# Patient Record
Sex: FEMALE | Race: WHITE | NOT HISPANIC OR LATINO | Employment: FULL TIME | URBAN - METROPOLITAN AREA
[De-identification: names, ages, dates, MRNs, and addresses within clinical notes are randomized per-mention and may not be internally consistent; named-entity substitution may affect disease eponyms.]

---

## 2017-08-05 ENCOUNTER — HOSPITAL ENCOUNTER (EMERGENCY)
Facility: MEDICAL CENTER | Age: 52
End: 2017-08-05
Attending: EMERGENCY MEDICINE
Payer: COMMERCIAL

## 2017-08-05 VITALS
SYSTOLIC BLOOD PRESSURE: 143 MMHG | BODY MASS INDEX: 32.65 KG/M2 | HEIGHT: 69 IN | TEMPERATURE: 99 F | HEART RATE: 92 BPM | DIASTOLIC BLOOD PRESSURE: 89 MMHG | RESPIRATION RATE: 18 BRPM | WEIGHT: 220.46 LBS | OXYGEN SATURATION: 98 %

## 2017-08-05 DIAGNOSIS — S61.219A LACERATION OF FINGER OF LEFT HAND, INITIAL ENCOUNTER: Primary | ICD-10-CM

## 2017-08-05 PROCEDURE — 304999 HCHG REPAIR-SIMPLE/INTERMED LEVEL 1

## 2017-08-05 PROCEDURE — 303747 HCHG EXTRA SUTURE

## 2017-08-05 PROCEDURE — A9270 NON-COVERED ITEM OR SERVICE: HCPCS | Performed by: EMERGENCY MEDICINE

## 2017-08-05 PROCEDURE — 700102 HCHG RX REV CODE 250 W/ 637 OVERRIDE(OP): Performed by: EMERGENCY MEDICINE

## 2017-08-05 PROCEDURE — 99283 EMERGENCY DEPT VISIT LOW MDM: CPT

## 2017-08-05 PROCEDURE — 90715 TDAP VACCINE 7 YRS/> IM: CPT | Performed by: EMERGENCY MEDICINE

## 2017-08-05 PROCEDURE — 304217 HCHG IRRIGATION SYSTEM

## 2017-08-05 PROCEDURE — 90471 IMMUNIZATION ADMIN: CPT

## 2017-08-05 PROCEDURE — 700111 HCHG RX REV CODE 636 W/ 250 OVERRIDE (IP): Performed by: EMERGENCY MEDICINE

## 2017-08-05 RX ORDER — HYDROCODONE BITARTRATE AND ACETAMINOPHEN 5; 325 MG/1; MG/1
1 TABLET ORAL ONCE
Status: COMPLETED | OUTPATIENT
Start: 2017-08-05 | End: 2017-08-05

## 2017-08-05 RX ORDER — IBUPROFEN 600 MG/1
600 TABLET ORAL ONCE
Status: COMPLETED | OUTPATIENT
Start: 2017-08-05 | End: 2017-08-05

## 2017-08-05 RX ADMIN — IBUPROFEN 600 MG: 600 TABLET, FILM COATED ORAL at 21:16

## 2017-08-05 RX ADMIN — HYDROCODONE BITARTRATE AND ACETAMINOPHEN 1 TABLET: 5; 325 TABLET ORAL at 21:15

## 2017-08-05 RX ADMIN — CLOSTRIDIUM TETANI TOXOID ANTIGEN (FORMALDEHYDE INACTIVATED), CORYNEBACTERIUM DIPHTHERIAE TOXOID ANTIGEN (FORMALDEHYDE INACTIVATED), BORDETELLA PERTUSSIS TOXOID ANTIGEN (GLUTARALDEHYDE INACTIVATED), BORDETELLA PERTUSSIS FILAMENTOUS HEMAGGLUTININ ANTIGEN (FORMALDEHYDE INACTIVATED), BORDETELLA PERTUSSIS PERTACTIN ANTIGEN, AND BORDETELLA PERTUSSIS FIMBRIAE 2/3 ANTIGEN 0.5 ML: 5; 2; 2.5; 5; 3; 5 INJECTION, SUSPENSION INTRAMUSCULAR at 21:00

## 2017-08-05 ASSESSMENT — PAIN SCALES - GENERAL: PAINLEVEL_OUTOF10: 4

## 2017-08-05 NOTE — ED AVS SNAPSHOT
8/5/2017    Pretty Guerrero  [address] [place] [address]    Dear Pretty:    [facility] wants to ensure your discharge home is safe and you or your loved ones have had all of your questions answered regarding your care after you leave the hospital.    Below is a list of resources and contact information should you have any questions regarding your hospital stay, follow-up instructions, or active medical symptoms.    Questions or Concerns Regarding… Contact   Medical Questions Related to Your Discharge  (7 days a week, 8am-5pm) Contact a Nurse Care Coordinator   [phone]   Medical Questions Not Related to Your Discharge  (24 hours a day / 7 days a week)  Contact the Nurse Health Line   [phone]    Medications or Discharge Instructions Refer to your discharge packet   or contact your [facility] Primary Care Provider   [phone]   Follow-up Appointment(s) Schedule your appointment via Transfercar   or contact Scheduling [phone]   Billing Review your statement via Transfercar  or contact Billing [phone]   Medical Records Review your records via Transfercar   or contact Medical Records [phone]     You may receive a telephone call within two days of discharge. This call is to make certain you understand your discharge instructions and have the opportunity to have any questions answered. You can also easily access your medical information, test results and upcoming appointments via the Transfercar free online health management tool. You can learn more and sign up at [email or web address]. For assistance setting up your Transfercar account, please call [phone].    Once again, we want to ensure your discharge home is safe and that you have a clear understanding of any next steps in your care. If you have any questions or concerns, please do not hesitate to contact us, we are here for you. Thank you for choosing [facility] for your healthcare needs.    Sincerely,    Your [facility] Healthcare Team

## 2017-08-05 NOTE — ED AVS SNAPSHOT
"[employer] Access Code: [email or web address]  Expires: 9/4/2017  8:35 PM    "[employer]  A secure, online tool to manage your health information     [employer]’s "[employer]® is a secure, online tool that connects you to your personalized health information from the privacy of your home -- day or night - making it very easy for you to manage your healthcare. Once the activation process is completed, you can even access your medical information using the "[employer] jamie, which is available for free in the Apple Jamie store or Google Play store.     "[employer] provides the following levels of access (as shown below):   My Chart Features   [facility] Primary Care Doctor [facility]  Specialists [facility]  Urgent  Care Non-[facility]  Primary Care  Doctor   Email your healthcare team securely and privately 24/7 X X X X   Manage appointments: schedule your next appointment; view details of past/upcoming appointments X      Request prescription refills. X      View recent personal medical records, including lab and immunizations X X X X   View health record, including health history, allergies, medications X X X X   Read reports about your outpatient visits, procedures, consult and ER notes X X X X   See your discharge summary, which is a recap of your hospital and/or ER visit that includes your diagnosis, lab results, and care plan. X X       How to register for "[employer]:  1. Go to  [email or web address].  2. Click on the Sign Up Now box, which takes you to the New Member Sign Up page. You will need to provide the following information:  a. Enter your "[employer] Access Code exactly as it appears at the top of this page. (You will not need to use this code after you’ve completed the sign-up process. If you do not sign up before the expiration date, you must request a new code.)   b. Enter your date of birth.   c. Enter your home email address.   d. Click Submit, and follow the next screen’s instructions.  3. Create a "[employer] ID. This will be your [employer]Met  login ID and cannot be changed, so think of one that is secure and easy to remember.  4. Create a [employer] password. You can change your password at any time.  5. Enter your Password Reset Question and Answer. This can be used at a later time if you forget your password.   6. Enter your e-mail address. This allows you to receive e-mail notifications when new information is available in [employer].  7. Click Sign Up. You can now view your health information.    For assistance activating your [employer] account, call [phone]

## 2017-08-05 NOTE — ED AVS SNAPSHOT
Home Care Instructions                                                                                                                Pretty Guerrero   MRN: [id number]    Department:  [facility], Emergency Dept   Date of Visit:  8/5/2017            [facility] Emergency Dept    [address]    [place] [address]    Phone:  [phone]      You were seen by     Calista Peck D.O.      Your Diagnosis Was     Laceration of finger of left hand, initial encounter     S61.219A       These are the medications you received during your hospitalization from 08/05/2017 2032 to 08/05/2017 2118     Date/Time Order Dose Route Action    08/05/2017 2100 tetanus-dipth-acell pertussis (ADACEL) inj 0.5 mL 0.5 mL Intramuscular Given    08/05/2017 2116 ibuprofen (MOTRIN) tablet 600 mg 600 mg Oral Given    08/05/2017 2115 hydrocodone-acetaminophen (NORCO) 5-325 MG per tablet 1 Tab 1 Tab Oral Given      Follow-up Information     1. Follow up with [facility], Emergency Dept In 2 days.    Specialty:  Emergency Medicine    Why:  For wound re-check    Contact information    [address] [address] [phone]        2. Follow up with [facility] Emergency Dept In 1 week.    Specialty:  Emergency Medicine    Why:  For suture removal    Contact information    [address] [address] [phone]        3. Follow up with [facility] In 2 days.    Contact information    [facility] [address] [phone]          4. Follow up with [facility] In 2 days.    Contact information    [address] [place] [address] [phone]        5. Follow up with Cris Velazquez D.O. In 2 days.    Specialty:  Family Medicine    Contact information    [address]  [facility] [address] [phone]        Medication Information     Review all of your home medications and newly  ordered medications with your primary doctor and/or pharmacist as soon as possible. Follow medication instructions as directed by your doctor and/or pharmacist.     Please keep your complete medication list with you and share with your physician. Update the information when medications are discontinued, doses are changed, or new medications (including over-the-counter products) are added; and carry medication information at all times in the event of emergency situations.               Medication List      Notice     You have not been prescribed any medications.              Discharge Instructions       Follow-up with primary care, urgent care emergency department in 48 hours for reevaluation and wound check.  Follow-up with primary care, urgent care or emergency department in 7 days for suture removal.    Your blood pressure was elevated in the emergency department, follow-up with primary care for close monitoring.    Keep wound clean, dry and intact. Starting tomorrow he may cleanse wound gently, warm water, soap, pat dry. Apply antibiotic ointment twice daily. Keep covered active.  Rest and elevation as needed for throbbing, swelling.    Tylenol or Motrin as needed for discomfort.    Return to the emergency department for increased pain, swelling, redness, bleeding or other drainage, fever or other new concerns.      Laceration Care, Adult  A laceration is a cut that goes through all layers of the skin. The cut also goes into the tissue that is right under the skin. Some cuts heal on their own. Others need to be closed with stitches (sutures), staples, skin adhesive strips, or wound glue. Taking care of your cut lowers your risk of infection and helps your cut to heal better.  HOW TO TAKE CARE OF YOUR CUT  For stitches or staples:  · Keep the wound clean and dry.  · If you were given a bandage (dressing), you should change it at least one time per day or as told by your doctor. You should also change it if it gets  wet or dirty.  · Keep the wound completely dry for the first 24 hours or as told by your doctor. After that time, you may take a shower or a bath. However, make sure that the wound is not soaked in water until after the stitches or staples have been removed.  · Clean the wound one time each day or as told by your doctor:  ¨ Wash the wound with soap and water.  ¨ Rinse the wound with water until all of the soap comes off.  ¨ Pat the wound dry with a clean towel. Do not rub the wound.  · After you clean the wound, put a thin layer of antibiotic ointment on it as told by your doctor. This ointment:  ¨ Helps to prevent infection.  ¨ Keeps the bandage from sticking to the wound.  · Have your stitches or staples removed as told by your doctor.  If your doctor used skin adhesive strips:   · Keep the wound clean and dry.  · If you were given a bandage, you should change it at least one time per day or as told by your doctor. You should also change it if it gets dirty or wet.  · Do not get the skin adhesive strips wet. You can take a shower or a bath, but be careful to keep the wound dry.  · If the wound gets wet, pat it dry with a clean towel. Do not rub the wound.  · Skin adhesive strips fall off on their own. You can trim the strips as the wound heals. Do not remove any strips that are still stuck to the wound. They will fall off after a while.  If your doctor used wound glue:  · Try to keep your wound dry, but you may briefly wet it in the shower or bath. Do not soak the wound in water, such as by swimming.  · After you take a shower or a bath, gently pat the wound dry with a clean towel. Do not rub the wound.  · Do not do any activities that will make you really sweaty until the skin glue has fallen off on its own.  · Do not apply liquid, cream, or ointment medicine to your wound while the skin glue is still on.  · If you were given a bandage, you should change it at least one time per day or as told by your doctor. You  should also change it if it gets dirty or wet.  · If a bandage is placed over the wound, do not let the tape for the bandage touch the skin glue.  · Do not pick at the glue. The skin glue usually stays on for 5-10 days. Then, it falls off of the skin.  General Instructions   · To help prevent scarring, make sure to cover your wound with sunscreen whenever you are outside after stitches are removed, after adhesive strips are removed, or when wound glue stays in place and the wound is healed. Make sure to wear a sunscreen of at least 30 SPF.  · Take over-the-counter and prescription medicines only as told by your doctor.  · If you were given antibiotic medicine or ointment, take or apply it as told by your doctor. Do not stop using the antibiotic even if your wound is getting better.  · Do not scratch or pick at the wound.  · Keep all follow-up visits as told by your doctor. This is important.  · Check your wound every day for signs of infection. Watch for:  ¨ Redness, swelling, or pain.  ¨ Fluid, blood, or pus.  · Raise (elevate) the injured area above the level of your heart while you are sitting or lying down, if possible.  GET HELP IF:  · You got a tetanus shot and you have any of these problems at the injection site:  ¨ Swelling.  ¨ Very bad pain.  ¨ Redness.  ¨ Bleeding.  · You have a fever.  · A wound that was closed breaks open.  · You notice a bad smell coming from your wound or your bandage.  · You notice something coming out of the wound, such as wood or glass.  · Medicine does not help your pain.  · You have more redness, swelling, or pain at the site of your wound.  · You have fluid, blood, or pus coming from your wound.  · You notice a change in the color of your skin near your wound.  · You need to change the bandage often because fluid, blood, or pus is coming from the wound.  · You start to have a new rash.  · You start to have numbness around the wound.  GET HELP RIGHT AWAY IF:  · You have very bad  swelling around the wound.  · Your pain suddenly gets worse and is very bad.  · You notice painful lumps near the wound or on skin that is anywhere on your body.  · You have a red streak going away from your wound.  · The wound is on your hand or foot and you cannot move a finger or toe like you usually can.  · The wound is on your hand or foot and you notice that your fingers or toes look pale or bluish.     This information is not intended to replace advice given to you by your health care provider. Make sure you discuss any questions you have with your health care provider.     Document Released: 06/05/2009 Document Revised: 05/03/2016 Document Reviewed: 12/14/2015  [employer] Interactive Patient Education ©2016 [employer]                Patient Information     Patient Information    Following emergency treatment: all patient requiring follow-up care must return either to a private physician or a clinic if your condition worsens before you are able to obtain further medical attention, please return to the emergency room.     Billing Information    At [facility], we work to make the billing process streamlined for our patients.  Our Representatives are here to answer any questions you may have regarding your hospital bill.  If you have insurance coverage and have supplied your insurance information to us, we will submit a claim to your insurer on your behalf.  Should you have any questions regarding your bill, we can be reached online or by phone as follows:  Online: You are able pay your bills online or live chat with our representatives about any billing questions you may have. We are here to help Monday - Friday from 8:00am to 7:30pm and 9:00am - 12:00pm on Saturdays.  Please visit [email or web address]  for more information.   Phone:  [phone] or [phone]    Please note that your emergency physician, surgeon, pathologist, radiologist, anesthesiologist, and other  specialists are not employed by [facility] and will therefore bill separately for their services.  Please contact them directly for any questions concerning their bills at the numbers below:     Emergency Physician Services:  [phone]  [facility]:  [phone]  [facility]:  [phone]  [facility]:  [phone]    1. Your final bill may vary from the amount quoted upon discharge if all procedures are not complete at that time, or if your doctor has additional procedures of which we are not aware. You will receive an additional bill if you return to the Emergency Department at [facility] for suture removal regardless of the facility of which the sutures were placed.     2. Please arrange for settlement of this account at the emergency registration.    3. All self-pay accounts are due in full at the time of treatment.  If you are unable to meet this obligation then payment is expected within 4-5 days.     4. If you have had radiology studies (CT, X-ray, Ultrasound, MRI), you have received a preliminary result during your emergency department visit. Please contact the radiology department [phone] to receive a copy of your final result. Please discuss the Final result with your primary physician or with the follow up physician provided.     Crisis Hotline:  [place] Crisis Hotline:  1-724-SVKTAWJ or 1-814.377.2014  [place] Crisis Hotline:    1-544.777.3938 or 539-561-7911         ED Discharge Follow Up Questions    1. In order to provide you with very good care, we would like to follow up with a phone call in the next few days.  May we have your permission to contact you?     YES /  NO    2. What is the best phone number to call you? (       )_____-__________    3. What is the best time to call you?      Morning  /  Afternoon  /  Evening                   Patient Signature:  ____________________________________________________________    Date:   ____________________________________________________________

## 2017-08-06 NOTE — ED PROVIDER NOTES
"ED Provider Note    CHIEF COMPLAINT  Chief Complaint   Patient presents with   • Laceration       HPI  Pretty Guerrero is a 52 y.o. female who presents to the emergency department for left index finger laceration. Patient was cutting bread when the knife slipped and cut her finger. Bleeding controlled with pressure prior to arrival. Unknown last tetanus, greater than 5 years. No medications taken for discomfort.    REVIEW OF SYSTEMS  See HPI for further details. All other systems are negative.     PAST MEDICAL HISTORY    denies    SOCIAL HISTORY  Social History     Social History Main Topics   • Smoking status: Never Smoker    • Smokeless tobacco: Never Used   • Alcohol Use: Yes      Comment: Occasionally   • Drug Use: No   • Sexual Activity: Not on file       SURGICAL HISTORY  patient denies any surgical history    CURRENT MEDICATIONS  Home Medications     Reviewed by Mitchell Brizuela R.N. (Registered Nurse) on 08/05/17 at 2043  Med List Status: Complete    Medication Last Dose Status          Patient Stuart Taking any Medications                        ALLERGIES  No Known Allergies    PHYSICAL EXAM  VITAL SIGNS: /89 mmHg  Pulse 92  Temp(Src) 37.2 °C (99 °F)  Resp 18  Ht 1.753 m (5' 9\")  Wt 100 kg (220 lb 7.4 oz)  BMI 32.54 kg/m2  SpO2 98%  Pulse ox interpretation: I interpret this pulse ox as normal.  Constitutional: Alert in no apparent distress.  HENT: Normocephalic, atraumatic. Bilateral external ears normal, Nose normal. Moist mucous membranes.    Eyes: Pupils are equal and reactive, Conjunctiva normal.   Neck: Normal range of motion, Supple.  Cardiovascular: Normal peripheral perfusion.  Thorax & Lungs: Nonlabored respirations.  Skin: Warm, Dry. There is a 1.5 cm subcutaneous laceration over the PIP of the left dorsal index finger. Full range of motion, flexion and extension intact. Less than 2 2nd capillary refill, sensation intact to light touch distally. No exposed neurovascular bundle, nor " tendon.  Musculoskeletal: Good range of motion in all major joints.   Neurologic: Alert , no gross focal deficit noted.  Psychiatric: Affect normal, Judgment normal, Mood normal.       DIAGNOSTIC STUDIES / PROCEDURES    LACERATION REPAIR PROCEDURE NOTE  The patient's identification was confirmed and consent was obtained.  This procedure was performed by Dr. Peck.  Site: Left index finger  Sterile procedures observed  Anesthetic used (type and amt): 3 cc lidocaine 1% without epinephrine   Suture type/size: 4-0 nylons   Length: 1.5 cm  # of Sutures: 4  Technique: Simple interrupted  Complexity: Simple  Antibx ointment applied, Adaptic, gauze, Kerlix.  Tetanus ordered and given  Site anesthetized, irrigated with NS, explored without evidence of foreign body, wound well approximated, site covered with dry, sterile dressing. Patient tolerated procedure well without complications. Instructions for care discussed verbally and patient provided with additional written instructions for homecare and f/u.      COURSE & MEDICAL DECISION MAKING  Left index finger laceration was repaired as described above with good hemostasis and approximation. Nonstick dressing placed by myself. CMS intact distally. Tetanus updated.    Patient is stable for discharge at this time, anticipatory guidance and wound care instructions provided, close follow-up is encouraged in 48 hours for wound check in 7-10 days for suture removal, and strict ED return instructions have been detailed. Patient and her  are agreeable to the disposition and plan.    Patient's blood pressure was elevated in the emergency department, and has been referred to primary care for close monitoring.    FINAL IMPRESSION  (S61.219A) Laceration of finger of left hand, initial encounter  (primary encounter diagnosis)      Electronically signed by: Calista Peck, 8/5/2017 9:00 PM      This dictation was created using voice recognition software. The accuracy of the  dictation is limited to the abilities of the software. I expect there may be some errors of grammar and possibly content. The nursing notes were reviewed and certain aspects of this information were incorporated into this note.

## 2017-08-06 NOTE — DISCHARGE INSTRUCTIONS
Follow-up with primary care, urgent care emergency department in 48 hours for reevaluation and wound check.  Follow-up with primary care, urgent care or emergency department in 7 days for suture removal.    Your blood pressure was elevated in the emergency department, follow-up with primary care for close monitoring.    Keep wound clean, dry and intact. Starting tomorrow he may cleanse wound gently, warm water, soap, pat dry. Apply antibiotic ointment twice daily. Keep covered active.  Rest and elevation as needed for throbbing, swelling.    Tylenol or Motrin as needed for discomfort.    Return to the emergency department for increased pain, swelling, redness, bleeding or other drainage, fever or other new concerns.      Laceration Care, Adult  A laceration is a cut that goes through all layers of the skin. The cut also goes into the tissue that is right under the skin. Some cuts heal on their own. Others need to be closed with stitches (sutures), staples, skin adhesive strips, or wound glue. Taking care of your cut lowers your risk of infection and helps your cut to heal better.  HOW TO TAKE CARE OF YOUR CUT  For stitches or staples:  · Keep the wound clean and dry.  · If you were given a bandage (dressing), you should change it at least one time per day or as told by your doctor. You should also change it if it gets wet or dirty.  · Keep the wound completely dry for the first 24 hours or as told by your doctor. After that time, you may take a shower or a bath. However, make sure that the wound is not soaked in water until after the stitches or staples have been removed.  · Clean the wound one time each day or as told by your doctor:  ¨ Wash the wound with soap and water.  ¨ Rinse the wound with water until all of the soap comes off.  ¨ Pat the wound dry with a clean towel. Do not rub the wound.  · After you clean the wound, put a thin layer of antibiotic ointment on it as told by your doctor. This  ointment:  ¨ Helps to prevent infection.  ¨ Keeps the bandage from sticking to the wound.  · Have your stitches or staples removed as told by your doctor.  If your doctor used skin adhesive strips:   · Keep the wound clean and dry.  · If you were given a bandage, you should change it at least one time per day or as told by your doctor. You should also change it if it gets dirty or wet.  · Do not get the skin adhesive strips wet. You can take a shower or a bath, but be careful to keep the wound dry.  · If the wound gets wet, pat it dry with a clean towel. Do not rub the wound.  · Skin adhesive strips fall off on their own. You can trim the strips as the wound heals. Do not remove any strips that are still stuck to the wound. They will fall off after a while.  If your doctor used wound glue:  · Try to keep your wound dry, but you may briefly wet it in the shower or bath. Do not soak the wound in water, such as by swimming.  · After you take a shower or a bath, gently pat the wound dry with a clean towel. Do not rub the wound.  · Do not do any activities that will make you really sweaty until the skin glue has fallen off on its own.  · Do not apply liquid, cream, or ointment medicine to your wound while the skin glue is still on.  · If you were given a bandage, you should change it at least one time per day or as told by your doctor. You should also change it if it gets dirty or wet.  · If a bandage is placed over the wound, do not let the tape for the bandage touch the skin glue.  · Do not pick at the glue. The skin glue usually stays on for 5-10 days. Then, it falls off of the skin.  General Instructions   · To help prevent scarring, make sure to cover your wound with sunscreen whenever you are outside after stitches are removed, after adhesive strips are removed, or when wound glue stays in place and the wound is healed. Make sure to wear a sunscreen of at least 30 SPF.  · Take over-the-counter and prescription  medicines only as told by your doctor.  · If you were given antibiotic medicine or ointment, take or apply it as told by your doctor. Do not stop using the antibiotic even if your wound is getting better.  · Do not scratch or pick at the wound.  · Keep all follow-up visits as told by your doctor. This is important.  · Check your wound every day for signs of infection. Watch for:  ¨ Redness, swelling, or pain.  ¨ Fluid, blood, or pus.  · Raise (elevate) the injured area above the level of your heart while you are sitting or lying down, if possible.  GET HELP IF:  · You got a tetanus shot and you have any of these problems at the injection site:  ¨ Swelling.  ¨ Very bad pain.  ¨ Redness.  ¨ Bleeding.  · You have a fever.  · A wound that was closed breaks open.  · You notice a bad smell coming from your wound or your bandage.  · You notice something coming out of the wound, such as wood or glass.  · Medicine does not help your pain.  · You have more redness, swelling, or pain at the site of your wound.  · You have fluid, blood, or pus coming from your wound.  · You notice a change in the color of your skin near your wound.  · You need to change the bandage often because fluid, blood, or pus is coming from the wound.  · You start to have a new rash.  · You start to have numbness around the wound.  GET HELP RIGHT AWAY IF:  · You have very bad swelling around the wound.  · Your pain suddenly gets worse and is very bad.  · You notice painful lumps near the wound or on skin that is anywhere on your body.  · You have a red streak going away from your wound.  · The wound is on your hand or foot and you cannot move a finger or toe like you usually can.  · The wound is on your hand or foot and you notice that your fingers or toes look pale or bluish.     This information is not intended to replace advice given to you by your health care provider. Make sure you discuss any questions you have with your health care provider.      Document Released: 06/05/2009 Document Revised: 05/03/2016 Document Reviewed: 12/14/2015  [employer] Patient Education ©2016 [employer]

## 2017-09-21 ENCOUNTER — OFFICE VISIT (OUTPATIENT)
Dept: MEDICAL GROUP | Age: 52
End: 2017-09-21
Payer: COMMERCIAL

## 2017-09-21 VITALS
DIASTOLIC BLOOD PRESSURE: 68 MMHG | BODY MASS INDEX: 34.15 KG/M2 | TEMPERATURE: 98.2 F | HEIGHT: 67 IN | WEIGHT: 217.6 LBS | OXYGEN SATURATION: 98 % | HEART RATE: 88 BPM | SYSTOLIC BLOOD PRESSURE: 116 MMHG

## 2017-09-21 DIAGNOSIS — Z12.31 VISIT FOR SCREENING MAMMOGRAM: ICD-10-CM

## 2017-09-21 DIAGNOSIS — G89.29 CHRONIC LEFT SHOULDER PAIN: ICD-10-CM

## 2017-09-21 DIAGNOSIS — Z98.84 STATUS POST GASTRIC BYPASS FOR OBESITY: ICD-10-CM

## 2017-09-21 DIAGNOSIS — M25.512 CHRONIC LEFT SHOULDER PAIN: ICD-10-CM

## 2017-09-21 DIAGNOSIS — E66.9 OBESITY (BMI 30.0-34.9): ICD-10-CM

## 2017-09-21 PROCEDURE — 99204 OFFICE O/P NEW MOD 45 MIN: CPT | Performed by: INTERNAL MEDICINE

## 2017-09-21 RX ORDER — CHOLECALCIFEROL (VITAMIN D3) 50 MCG
TABLET ORAL DAILY
COMMUNITY

## 2017-09-21 RX ORDER — IBUPROFEN 800 MG/1
800 TABLET ORAL EVERY 8 HOURS PRN
COMMUNITY

## 2017-09-21 RX ORDER — CHOLECALCIFEROL (VITAMIN D3) 25 MCG
TABLET,CHEWABLE ORAL
COMMUNITY

## 2017-09-21 RX ORDER — TRAMADOL HYDROCHLORIDE 50 MG/1
50 TABLET ORAL
Qty: 12 TAB | Refills: 0 | Status: SHIPPED | OUTPATIENT
Start: 2017-09-21

## 2017-09-21 ASSESSMENT — PATIENT HEALTH QUESTIONNAIRE - PHQ9: CLINICAL INTERPRETATION OF PHQ2 SCORE: 0

## 2017-09-21 ASSESSMENT — PAIN SCALES - GENERAL: PAINLEVEL: 3=SLIGHT PAIN

## 2017-09-22 NOTE — PROGRESS NOTES
Pretty Guerrero is a 52 y.o. female here to establish care and the evaluation and management of:      HPI:    Chronic left shoulder pain  Patient reported that she has pain on the left shoulder radiated to left deltoid muscle. She has left shoulder pain for about 2 months. The pain is constant, sharp in nature. She could not sleep well at night because of the severe pain. 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 She does not feel improvement with all above conservative treatment. She is interested to do steroid injection. She also concerning of rotator cuff tendinitis or tear. She had history of rotator cuff repair on right shoulder in 2013. She wants to see orthopedist for her left shoulder as she worries that she has another rotator cuff problem on the left shoulder. She cannot do external rotation to wear her bras due to pain.    Status post gastric bypass for obesity  Patient stated that she has laparoscopic Elissa-en-Y rule gastric bypass surgery in 2005. She used to weight 400 pound. She lost weight significantly after surgery. She is taking vitamin B12 1000 µg daily, vitamin D 2000 units daily, and iron supplements daily. She denied abdominal pain or diarrhea or constipation. She sometimes has constipation from taking iron, but she adjusted dose of iron by herself.    Obesity (BMI 30.0-34.9)  Patient stated that she tries to eat healthy and she tries to keep active. She has pain on the left shoulder and she cannot do a lot of exercise. She has exercised at gym for cardio and weightlifting.    Current medicines (including changes today)  Current Outpatient Prescriptions   Medication Sig Dispense Refill   • ibuprofen (MOTRIN) 800 MG Tab Take 800 mg by mouth every 8 hours as needed.     • DiphenhydrAMINE HCl (BENADRYL ALLERGY PO) Take  by mouth.     • Cyanocobalamin (B-12) 1000 MCG Cap Take  by mouth.     • Diclofenac Sodium 1 % Gel Apply 2 gram on  "left shoulder twice a day as needed. 1 Tube 3   • tramadol (ULTRAM) 50 MG Tab Take 1 Tab by mouth at bedtime as needed. 12 Tab 0   • IRON, FERROUS SULFATE, PO Take  by mouth.     • Cholecalciferol (VITAMIN D) 2000 UNIT Tab Take  by mouth every day.       No current facility-administered medications for this visit.      She  has no past medical history on file.  She  has a past surgical history that includes rotator cuff repair (Right) and gastric bypass laparoscopic (2005).  Social History   Substance Use Topics   • Smoking status: Never Smoker   • Smokeless tobacco: Never Used   • Alcohol use 0.6 oz/week     1 Glasses of wine per week      Comment: Occasionally     Social History     Social History Narrative   • No narrative on file     Family History   Problem Relation Age of Onset   • Alzheimer's Disease Father      Family Status   Relation Status   • Mother Alive   • Father    • Sister Alive   • Brother Alive   • Maternal Grandmother    • Maternal Grandfather  at age 87    Heart attack   • Paternal Grandmother    • Paternal Grandfather    • Sister Alive   • Sister Alive   • Sister Alive   • Child Alive     Health Maintenance Topics with due status: Overdue       Topic Date Due    MAMMOGRAM 2004    COLONOSCOPY 2014    IMM INFLUENZA 2017         ROS    Gen.: Denied weight change, appetite change, fatigue.  ENT: Denied sinus tenderness, nasal congestion, runny nose, or sore throat  CVS: Denied chest pain, palpitations, legs swelling.  Respiratory: Denied cough, shortness of breath, wheezing.  GI: Denied abdominal pain, constipation or diarrhea.  Endocrine: Denied temperature intolerance, increased frequency of urination, polyphagia or polydipsia.  Musculoskeletal: Denied back pain or joint pain.    All other systems reviewed and are negative     Objective:     Blood pressure 116/68, pulse 88, temperature 36.8 °C (98.2 °F), height 1.702 m (5' 7\"), weight 98.7 " kg (217 lb 9.6 oz), SpO2 98 %, not currently breastfeeding. Body mass index is 34.08 kg/m².  Physical Exam:    Constitutional: Well nourished and Well developed, Alert, no distress.  Skin: Warm, dry, good turgor, no rashes in visible areas.  Eye: Equal, round and reactive, conjunctiva clear, lids normal.  ENMT: Lips without lesions, good dentition, oropharynx clear.  Neck: Trachea midline, no masses, no thyromegaly. No cervical or supraclavicular lymphadenopathy.  Respiratory: Unlabored respiratory effort, lungs clear to auscultation, no wheezes, no ronchi.  Cardiovascular: Normal S1, S2, no murmur, no edema.   Abdomen: Soft, non distended, non-tender, no masses, no hepatosplenomegaly. Bowel sound normal.  Extremities: No edema, no clubbing, no cyanosis.  Psych: Alert and oriented x3, normal affect and mood.  Musculoskeletal exam: Tender on palpation of the left shoulder. Range of motion of movement of the left shoulder is limited due to the pain. She is not able to do external rotation. She is not able to lift her shoulder above 180 degree due to pain.        Assessment and Plan:   The following treatment plan was discussed       1. Chronic left shoulder pain  - Advised to try Tylenol in the daytime and tramadol 50 mg daily at bedtime when necessary for temporary only.  - Advised to apply diclofenac gel twice a day on her left shoulder. She can continue ice pad, stretching exercise by herself. 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  - Refer her to orthopedist for further evaluation and treatment.  - Discussed the risks and side effects of tramadol with patient including risk of dependency and recommended to rare use only.  - Advised to be cautious for sedation effect of tramadol and recommend not to take tramadol with alcohol or other sedating medications. Recommend to avoid driving or operating heavy machinery when she feels drowsy or sleepy from taking  medication.  - REFERRAL TO ORTHOPEDICS  - Diclofenac Sodium 1 % Gel; Apply 2 gram on left shoulder twice a day as needed.  Dispense: 1 Tube; Refill: 3  - tramadol (ULTRAM) 50 MG Tab; Take 1 Tab by mouth at bedtime as needed.  Dispense: 12 Tab; Refill: 0    2. Obesity (BMI 30.0-34.9)  - Counseled for healthy diet and regular physical exercise to lose weight.   - Patient identified as having weight management issue.  Appropriate orders and counseling given.    3. Status post gastric bypass for obesity  - No abdominal complaint. Continue vitamin B12, vitamin D and iron supplement.    4. Visit for screening mammogram  - Order screening mammogram.   - MA-SCREEN MAMMO W/CAD-BILAT; Future        Records requested.  Followup: Return if symptoms worsen or fail to improve. sooner should new symptoms or problems arise.      Please note that this dictation was created using voice recognition software. I have made every reasonable attempt to correct obvious errors, but I expect that there may have unintended errors in text, spelling, punctuation, or grammar that I did not discover.

## 2017-09-22 NOTE — ASSESSMENT & PLAN NOTE
Patient stated that she has laparoscopic Elissa-en-Y rule gastric bypass surgery in 2005. She used to weight 400 pound. She lost weight significantly after surgery. She is taking vitamin B12 1000 µg daily, vitamin D 2000 units daily, and iron supplements daily. She denied abdominal pain or diarrhea or constipation. She sometimes has constipation from taking iron, but she adjusted dose of iron by herself.

## 2017-09-22 NOTE — ASSESSMENT & PLAN NOTE
Patient stated that she tries to eat healthy and she tries to keep active. She has pain on the left shoulder and she cannot do a lot of exercise. She has exercised at gym for cardio and weightlifting.

## 2017-09-22 NOTE — ASSESSMENT & PLAN NOTE
Patient reported that she has pain on the left shoulder radiated to left deltoid muscle. She has left shoulder pain for about 2 months. The pain is constant, sharp in nature. She could not sleep well at night because of the severe pain. 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 She does not feel improvement with all above conservative treatment. She is interested to do steroid injection. She also concerning of rotator cuff tendinitis or tear. She had history of rotator cuff repair on right shoulder in 2013. She wants to see orthopedist for her left shoulder as she worries that she has another rotator cuff problem on the left shoulder. She cannot do external rotation to wear her bras due to pain.

## 2017-11-15 ENCOUNTER — HOSPITAL ENCOUNTER (OUTPATIENT)
Dept: RADIOLOGY | Facility: MEDICAL CENTER | Age: 52
End: 2017-11-15
Attending: INTERNAL MEDICINE
Payer: COMMERCIAL

## 2017-11-15 DIAGNOSIS — Z12.31 VISIT FOR SCREENING MAMMOGRAM: ICD-10-CM

## 2017-11-15 PROCEDURE — G0202 SCR MAMMO BI INCL CAD: HCPCS

## 2017-12-05 ENCOUNTER — HOSPITAL ENCOUNTER (OUTPATIENT)
Facility: MEDICAL CENTER | Age: 52
End: 2017-12-05
Attending: ORTHOPAEDIC SURGERY | Admitting: ORTHOPAEDIC SURGERY
Payer: COMMERCIAL

## 2018-10-17 DIAGNOSIS — G89.29 CHRONIC LEFT SHOULDER PAIN: ICD-10-CM

## 2018-10-17 DIAGNOSIS — M25.512 CHRONIC LEFT SHOULDER PAIN: ICD-10-CM

## 2018-12-13 ENCOUNTER — HOSPITAL ENCOUNTER (OUTPATIENT)
Dept: RADIOLOGY | Facility: MEDICAL CENTER | Age: 53
End: 2018-12-13
Attending: INTERNAL MEDICINE
Payer: COMMERCIAL

## 2018-12-13 DIAGNOSIS — Z12.31 ENCOUNTER FOR SCREENING MAMMOGRAM FOR MALIGNANT NEOPLASM OF BREAST: ICD-10-CM

## 2018-12-13 PROCEDURE — 77067 SCR MAMMO BI INCL CAD: CPT

## 2019-02-24 DIAGNOSIS — M25.512 CHRONIC LEFT SHOULDER PAIN: ICD-10-CM

## 2019-02-24 DIAGNOSIS — G89.29 CHRONIC LEFT SHOULDER PAIN: ICD-10-CM
